# Patient Record
(demographics unavailable — no encounter records)

---

## 2025-06-02 NOTE — ASSESSMENT
[FreeTextEntry1] : 73-year-old female recently admitted to the hospital for worsening fluid overload initiated on hemodialysis through right IJ tunneled dialysis catheter now status post creation of left upper extremity radiocephalic arteriovenous fistula on May 8, 2025.  Plan Patient to follow up in 4 weeks for AVF duplex to assess maturity She will likely need a LUE fistulogram, as clinically, the vein is small and immature Ok to clean the wound with soap and water No

## 2025-06-02 NOTE — HISTORY OF PRESENT ILLNESS
[FreeTextEntry1] : 73-year-old female recently admitted to the hospital for worsening fluid overload initiated on hemodialysis through right IJ tunneled dialysis catheter now status post creation of left upper extremity radiocephalic arteriovenous fistula on May 8, 2025.  Patient denies pain, discoloration, numbness, or tingling of the left fingers or hand.  She is tolerating hemodialysis through the catheter uneventfully.  She states she occasionally experiences left arm cramping in the forearm after completing a dialysis session through the tunneled catheter.

## 2025-06-02 NOTE — PHYSICAL EXAM
[2+] : left 2+ [Ankle Swelling (On Exam)] : not present [Varicose Veins Of Lower Extremities] : not present [] : not present [Abdomen Tenderness] : ~T ~M No abdominal tenderness [Alert] : alert [Oriented to Person] : oriented to person [Oriented to Place] : oriented to place [Oriented to Time] : oriented to time [Calm] : calm [de-identified] : NAD [de-identified] : catheter site clean without erythema [de-identified] : unlabored breathing [FreeTextEntry1] : incision at left wrist c/d/i with dermabond; no erythema or drainage +palpable thrill [de-identified] : FROM of all 4 extremities